# Patient Record
Sex: MALE | Race: WHITE | NOT HISPANIC OR LATINO | URBAN - METROPOLITAN AREA
[De-identification: names, ages, dates, MRNs, and addresses within clinical notes are randomized per-mention and may not be internally consistent; named-entity substitution may affect disease eponyms.]

---

## 2022-04-28 ENCOUNTER — OFFICE (OUTPATIENT)
Dept: URBAN - METROPOLITAN AREA CLINIC 102 | Facility: CLINIC | Age: 62
End: 2022-04-28
Payer: COMMERCIAL

## 2022-04-28 VITALS
DIASTOLIC BLOOD PRESSURE: 78 MMHG | HEART RATE: 70 BPM | TEMPERATURE: 97.7 F | WEIGHT: 198 LBS | HEIGHT: 71 IN | SYSTOLIC BLOOD PRESSURE: 131 MMHG

## 2022-04-28 DIAGNOSIS — K74.60 UNSPECIFIED CIRRHOSIS OF LIVER: ICD-10-CM

## 2022-04-28 DIAGNOSIS — F10.20 ALCOHOL DEPENDENCE, UNCOMPLICATED: ICD-10-CM

## 2022-04-28 DIAGNOSIS — Z12.11 ENCOUNTER FOR SCREENING FOR MALIGNANT NEOPLASM OF COLON: ICD-10-CM

## 2022-04-28 PROCEDURE — 99205 OFFICE O/P NEW HI 60 MIN: CPT

## 2022-04-28 RX ORDER — FUROSEMIDE 40 MG/1
TABLET ORAL
Qty: 30 | Refills: 5 | Status: ACTIVE

## 2022-04-28 NOTE — SERVICEHPINOTES
QASIM EUGENE   is a   61   year old male who is being seen in consultation at the request of   [facility] [facility]   for cirrhosis. Was admitted to [facility] x 3-4 days beginning of March for cirrhosis but I do not have any records of this admission to review or imaging reports just PCP notes. Apparently had paracentesis x 2 while inpt. Had 5000ml then 7500ml removed 
br 
br
He started drinking after retiring approx 10 years ago and amt slowly increased. For the past couple years was drinking heavily, "all day every day", estimates around 100 drinks per week (whiskey). He slowly started noticing LE edema and fluid build up and then SOB. Had PCP appt in March and was sent directly to ER. States he was diagnosed w/ cirrhosis while inpt, stayed for 3-4 days. No concern for HE or SBP while inpt. brHe has not had any alcohol since admission, so approx 2 months now. He states he slowly started decreasing dose over past several months but since stopping completely has not had any withdrawal sx and does not have urge to drink now. 
br He is currently taking furosemide 20mg BID, spironolactone 50mg qd. He has not had any further issues with fluid build up or LE edema. He is trying to stick to low sodium diet. Has not noticed any unusual weight gain recently. He has lost weight (around 50lbs, muscle mass he feels) over past several years (was previously 250lbs).
br br He is also taking xifaxan 550mg BID and lactulose 30mL BID. Reports BMs initially looser when he started lactulose but now normal, 1-2x/day and formed. Some issues w/ word recall but has noticed  this occurring for several years no new memory issues and he is sleeping well (aside from nocturia). 
br No family hx of liver disease or cirrhosis. No regular NSAIDs or acetaminophen use--takes aspirin as needed but &lt1x/week.br
Nonsmoker. 
br 
Overall, he is feeling well today. No abd pain, n/v. He was previously on lisinopril/HCTZ and amlodipine for HTN but d/c during admission. BP seems to be well controlled on diuretics now per pt.
br br No prior EGD or colonoscopy. Denies rectal bleeding or melena. No family hx of CRC.br
br He has an appt w/ cardiologist in a couple weeks, states he was recommended to follow up with one following hospital stay (unclear why exactly). No known cardiac issues. br